# Patient Record
Sex: MALE
[De-identification: names, ages, dates, MRNs, and addresses within clinical notes are randomized per-mention and may not be internally consistent; named-entity substitution may affect disease eponyms.]

---

## 2017-06-27 ENCOUNTER — HOSPITAL ENCOUNTER (INPATIENT)
Dept: HOSPITAL 14 - H.ER | Age: 42
Discharge: HOME | DRG: 669 | End: 2017-06-27
Attending: INTERNAL MEDICINE | Admitting: INTERNAL MEDICINE
Payer: COMMERCIAL

## 2017-06-27 DIAGNOSIS — N50.812: ICD-10-CM

## 2017-06-27 DIAGNOSIS — N13.2: Primary | ICD-10-CM

## 2017-06-27 DIAGNOSIS — Z87.442: ICD-10-CM

## 2017-06-27 DIAGNOSIS — E87.6: ICD-10-CM

## 2017-06-27 LAB
ALBUMIN SERPL-MCNC: 4.5 G/DL (ref 3.5–5)
ALBUMIN/GLOB SERPL: 1.4 {RATIO} (ref 1–2.1)
ALT SERPL-CCNC: 53 U/L (ref 21–72)
AST SERPL-CCNC: 27 U/L (ref 17–59)
BASOPHILS # BLD AUTO: 0.1 K/UL (ref 0–0.2)
BASOPHILS NFR BLD: 0.9 % (ref 0–2)
BUN SERPL-MCNC: 17 MG/DL (ref 9–20)
CALCIUM SERPL-MCNC: 10 MG/DL (ref 8.4–10.2)
EOSINOPHIL # BLD AUTO: 0.3 K/UL (ref 0–0.7)
EOSINOPHIL NFR BLD: 3.5 % (ref 0–4)
ERYTHROCYTE [DISTWIDTH] IN BLOOD BY AUTOMATED COUNT: 13.3 % (ref 11.5–14.5)
GFR NON-AFRICAN AMERICAN: > 60
HGB BLD-MCNC: 15.3 G/DL (ref 12–18)
INR PPP: 1 (ref 0.9–1.2)
LYMPHOCYTES # BLD AUTO: 3.3 K/UL (ref 1–4.3)
LYMPHOCYTES NFR BLD AUTO: 34.3 % (ref 20–40)
MCH RBC QN AUTO: 28.9 PG (ref 27–31)
MCHC RBC AUTO-ENTMCNC: 34 G/DL (ref 33–37)
MCV RBC AUTO: 85.1 FL (ref 80–94)
MONOCYTES # BLD: 1 K/UL (ref 0–0.8)
MONOCYTES NFR BLD: 10.5 % (ref 0–10)
NEUTROPHILS # BLD: 5 K/UL (ref 1.8–7)
NEUTROPHILS NFR BLD AUTO: 50.8 % (ref 50–75)
NRBC BLD AUTO-RTO: 0.1 % (ref 0–0)
PLATELET # BLD: 261 K/UL (ref 130–400)
PMV BLD AUTO: 7.7 FL (ref 7.2–11.7)
PROTHROMBIN TIME: 11.5 SECONDS (ref 9.8–13.1)
RBC # BLD AUTO: 5.31 MIL/UL (ref 4.4–5.9)
WBC # BLD AUTO: 9.8 K/UL (ref 4.8–10.8)

## 2017-06-27 PROCEDURE — 0TC78ZZ EXTIRPATION OF MATTER FROM LEFT URETER, VIA NATURAL OR ARTIFICIAL OPENING ENDOSCOPIC: ICD-10-PCS | Performed by: UROLOGY

## 2017-06-27 PROCEDURE — 0T778DZ DILATION OF LEFT URETER WITH INTRALUMINAL DEVICE, VIA NATURAL OR ARTIFICIAL OPENING ENDOSCOPIC: ICD-10-PCS | Performed by: UROLOGY

## 2017-06-27 NOTE — ED PDOC
- Laboratory Results


Result Diagrams: 


 06/27/17 05:32





 06/27/17 05:32





- ECG


O2 Sat by Pulse Oximetry: 99 (RA)


Pulse Ox Interpretation: Normal





Medical Decision Making


Medical Decision Making: 


Time: 0600





Initial impression: Renal colic





Initial plan:





--Patient signed out to me by Darya Ashby PA-C. Pending Urology consult 

and evaluation





0620: Consulted with Dr. Montesinos, PT to be admitted to medicine  Dr. Tyler on 

call and he will see pt for possible surgery


pt made aware





Scribe Attestation:


Documented by Alicia Kevin, acting as a scribe for Patsy Palacios MD.





MD Scribe Attestation:


All medical record entries made by the Scribe were at my direction and 

personally dictated by me. I have reviewed the chart and agree that the record 

accurately reflects my personal performance of the history, physical exam, 

medical decision making, and the department course for this patient. I have 

also personally directed, reviewed, and agree with the discharge instructions 

and disposition.











Disposition


Discussed With Dr.: Travis Montesinos


Comment: Will consult with 


Doctor Will See Patient In The: Hospital





- Clinical Impression


Clinical Impression: 


 Renal colic








- POA


Present On Arrival: None





- Disposition


Disposition: Admitted as In-Patient


Disposition Time: 06:20


Condition: STABLE

## 2017-06-27 NOTE — CT
PROCEDURE:  CT abdomen pelvis dated 06/27/2017 



HISTORY:

Rule out renal stone 



COMPARISON:

None.



TECHNIQUE:

Contiguous axial images of the abdomen and pelvis. Oral contrast was 

administered. No IV contrast given. Coronal and Sagittal reformats 

generated. 



Radiation dose:



Total exam DLP = 748.68 mGy-cm.



This CT exam was performed using one or more of the following dose 

reduction techniques: Automated exposure control, adjustment of the 

mA and/or kV according to patient size, and/or use of iterative 

reconstruction technique.



FINDINGS:



LOWER THORAX:

Lung bases are free of focal consolidation effusion or basilar 

pneumothorax.  There is a small hiatal hernia. Heart size is within 

range of normal.  No pericardial effusion.



LIVER:

Liver exhibits normal size measuring approximately 15.4 cm in CC 

dimension. .  No obvious hepatic mass collection or calcification. 



GALLBLADDER AND BILE DUCTS:

The gallbladder is physiologically distended.  No evidence of 

intraluminal gallbladder calculi. . 



PANCREAS:

Unremarkable. No mass. No ductal dilatation.



SPLEEN:

Unremarkable. No splenomegaly. 



ADRENALS:

No adrenal lesions. 



KIDNEYS AND URETERS:

Kidneys exhibit relatively symmetric size. . 



There is an obstructing proximal left ureteral calculus that measures 

approximately 6 mm with mild -moderate left-sided hydronephrosis.  

Infiltration/induration surrounding the on ureter most pronounced the 

level of the calculus with some infiltration changes in the left 

perinephric fat. Nonobstructing 8.5 mm calculus seen in the 

upper/midpole left kidney 



There are 3 right-sided renal calculi 1 in the mid and another in the 

lower pole and another in the upper/midpole measuring approximately 

5.4 mm and 6 mm and 3.7 mm respectively. 



BLADDER:

Urinary bladder is incompletely distended which may account for 

slight thick-walled appearance.  Muscular hypertrophy may contribute. 

 Possibility of a cystitis not excluded. 



REPRODUCTIVE:

Unremarkable. 



APPENDIX:

Appendix is unremarkable without evidence of surrounding inflammatory 

changes.



BOWEL:

Evaluation of the bowel is limited due to the lack of oral contrast 

material. Stomach is incompletely distended which may account for 

slight thick-walled appearance.  Gastritis not excluded. Visualized 

loops of small bowel exhibit normal contour and caliber. No evidence 

of acute mechanical small bowel obstruction. 



Most of the colon is collapsed which may account for slight 

thick-walled appearance however concomitant submucosal fat deposition 

possibly due to chronic inflammation or mild of wall edema to mild 

colitis be considered.  Clinical correlation recommended. 



PERITONEUM:

Unremarkable. No fluid collection. No free air. Small fat containing 

bilateral inguinal hernias present. .  Small fat containing umbilical 

hernia. 



LYMPH NODES:

Unremarkable. No enlarged lymph nodes. 



VASCULATURE:

Unremarkable. No aortic aneurysm. 



BONES:

No fracture or destructive lesion. 



OTHER FINDINGS:

None. 



IMPRESSION:

Nephrolithiasis with obstructing 6 mm calculus proximal left ureter 

with mild to moderate left-sided hydronephrosis.



There is mild submucosal fat deposition and/or wall edema involving 

good portion of the colon possibly secondary to chronic inflammation 

however possibility of a mild colitis to be considered.



See above discussion for additional findings and details.

## 2017-06-27 NOTE — ED PDOC
HPI: Male  Pain


Time Seen by Provider: 06/27/17 04:11


Chief Complaint (Nursing): Male Genitourinary


Chief Complaint (Provider): Flank pain


History Per: Patient


Additional Complaint(s): 





Pt is a 43 yo male, PMH of kidney stones requiring lithotripsy, presents to ED 

with complaints of left flank pain radiating to left testicle x1 hour. Pt 

denies any problems urinating. As per pt, he woke up with the pain. Pt took 

Advil at 0330.





Past Medical History


Reviewed: Nursing Documentation, Vital Signs


Vital Signs: 





 Last Vital Signs











Temp  98 F   06/27/17 04:14


 


Pulse  52 L  06/27/17 04:14


 


Resp  18   06/27/17 04:14


 


BP  150/99 H  06/27/17 04:14


 


Pulse Ox  99   06/27/17 04:14














- Medical History


Other PMH: Kidney stones





- Surgical History


Other surgeries: lithotripsy





- Family History


Family History: States: Unknown Family Hx





- Living Arrangements


Living Arrangements: With Family





- Social History


Current smoker - smoking cessation education provided: No


Ex-Smoker (has not smoked in the last 12 months): No


Alcohol: Social


Drugs: Denies





- Allergies


Allergies/Adverse Reactions: 


 Allergies











Allergy/AdvReac Type Severity Reaction Status Date / Time


 


No Known Allergies Allergy   Verified 06/27/17 04:16














Review of Systems


ROS Statement: Except As Marked, All Systems Reviewed And Found Negative


Musculoskeletal: Positive for: Back Pain





Physical Exam





- Reviewed


Nursing Documentation Reviewed: Yes


Vital Signs Reviewed: Yes





- Physical Exam


Appears: Positive for: Non-toxic, No Acute Distress, Uncomfortable


Head Exam: Positive for: ATRAUMATIC, NORMAL INSPECTION, NORMOCEPHALIC


Skin: Positive for: Normal Color, Warm, DRY


Eye Exam: Positive for: EOMI, Normal appearance, PERRL


ENT: Positive for: Normal ENT Inspection


Neck: Positive for: Normal, Painless ROM


Cardiovascular/Chest: Positive for: Regular Rate, Rhythm


Respiratory: Positive for: CNT, Normal Breath Sounds


Gastrointestinal/Abdominal: Positive for: Normal Exam, Bowel Sounds, Soft


Back: Positive for: L CVA Tenderness


Extremity: Positive for: Normal ROM


Neurologic/Psych: Positive for: Alert, Oriented





- ECG


O2 Sat by Pulse Oximetry: 99





Medical Decision Making


Medical Decision Making: 





IV access established and treatment initiated with IVF, Zofran, Toradol and 

Morphine





Diagnostics ordered





case endorsed to ED MD, Dr. Lr, pending diagnostic review and re-eval





Disposition





- Clinical Impression


Clinical Impression: 


 Renal colic








- Patient ED Disposition


Is Patient to be Admitted: No





- Disposition


Disposition: Transfer of Care (dr. lr)


Disposition Time: 05:35


Condition: STABLE





- POA


Present On Arrival: None

## 2017-06-27 NOTE — CP.PCM.HP
History of Present Illness





- History of Present Illness


History of Present Illness: 





A 42 yr old male with hx of nephrolithiass 5 yrs ago was admitted for renal 

colic ,now came with c\o  testicular pain on left side  with abdominal pain 

lower , sharp ,colicky 10\10 from last night ,did not get better with OTC meds, 

no relief .


notes he wasn't drinking lot of fluids, he does not know what kind of stones he 

had in past


he is worried about diet,pros and cons of repititive events


also asking if sx can be done in am as his mom is going to be here this pm





Present on Admission





- Present on Admission


Any Indicators Present on Admission: No





Review of Systems





- Constitutional


Constitutional: absent: Anorexia, Chills, Fever, Frequent Falls, Weight Loss





- EENT


Eyes: Photophobia.  absent: Other Visual Disturbances


Nose/Mouth/Throat: Sore Throat.  absent: Nasal Congestion, Post Nasal Drip





- Cardiovascular


Cardiovascular: absent: Chest Pain, Dyspnea, Leg Edema, Rapid Heart Rate





- Respiratory


Respiratory: absent: Cough, Wheezing, Chest Congestion, Excessive Mucous 

Production





- Gastrointestinal


Gastrointestinal: absent: Diarrhea, Dyspepsia, Heartburn, Odynophagia, Vomiting





- Genitourinary


Genitourinary: absent: Difficulty Urinating, Hematuria, Nocturia, Urinary 

Urgency, Freq UTI





- Musculoskeletal


Musculoskeletal: absent: Arthralgias, Limited Range of Motion, Myalgias





- Integumentary


Integumentary: absent: Acne, Lesions, Sores





- Neurological


Neurological: absent: Abnormal Gait, Convulsions, Headaches, Syncope, Weakness





- Psychiatric


Psychiatric: absent: Anxiety, Difficulty Concentrating





- Endocrine


Endocrine: absent: Fatigue





- Hematologic/Lymphatic


Hematologic: absent: Lymphadenopathy





Past Patient History





- Past Medical History & Family History


Past Medical History?: Yes





- Past Social History


Smoking Status: Never Smoked





- CARDIAC


Hx Cardiac Disorders: No


Hx Angina: No


Hx Atrial Fibrillation: No


Hx Cardia Arrhythmia: No


Hx Circulatory Problems: No


Hx Congestive Heart Failure: No


Hx Heart Attack: No


Hx Heart Murmur: No


Hx Heart Transplant: No


Hx Hypercholesterolemia: No


Hx Hypertension: No


Hx Hypotension: No


Hx Internal Defibrillator: No


Hx Mitral Valve Prolapse: No


Hx Pacemaker: No


Hx Peripheral Edema: No


Hx Peripheral Vascular Disease: No





- PULMONARY


Hx Respiratory Disorders: Yes


Hx Asthma: Yes


Hx Bronchitis: No


Hx Chronic Obstructive Pulmonary Disease (COPD): No


Hx Emphysema: No


Hx Lung Cancer: No


Hx Pneumonia: No


Hx Pulmonary Edema: No


Hx Pulmonary Embolism: No


Hx Respiratory Aspiration: No


Hx Respiratory Tract Infection: No


Hx Sleep Apnea: No


Hx Tuberculosis: No





- NEUROLOGICAL


Hx Neurological Disorder: No


Hx Alzheimer's Disease: No


HX Cerebrovascular Accident: No


Hx Dementia: No


Hx Dizziness: No


Hx Meningitis: No


Hx Migraine: No


Hx Multiple Sclerosis: No


Hx Paralysis: No


Hx Parkinson's Disease: No


Hx Seizures: No


Hx Syncope: No


Hx Transient Ischemic Attacks (TIA): No


Hx Vertigo: No





- HEENT


Hx HEENT Problems: No


Hx Blind: No


Hx Cataracts: No


Hx Deafness: No


Hx Difficulty Chewing: No


Hx Epistaxis: No


Hx Glaucoma: No


Hx Macular Degeneration: No


Hx Sinusitis: No





- RENAL


Hx Chronic Kidney Disease: Yes


Hx Dialysis: No


Hx Kidney Stones: Yes


Hx Neurogenic Bladder: No


Hx Pyelonephritis: No


Hx Renal (Kidney) Cancer: No


Hx Renal Failure: No





- ENDOCRINE/METABOLIC


Hx Endocrine Disorders: No


Hx Adrenal Cancer: No


Hx Diabetes Insipidus: No


Hx Diabetes Mellitus Type 1: No


Hx Diabetes Mellitus Type 2: No


Hx Hyperthyroidism: No


Hx Hypothyroidism: No


Hx Systemic Lupus Erythematosus: No





- HEMATOLOGICAL/ONCOLOGICAL


Hx Blood Disorders: No


Hx AIDS: No


Hx Anemia: No


Hx Blood Transfusions: No


Hx Blood Transfusion Reaction: No


Hx Bruising: No


Hx Cancer: No


Hx Chemotherapy: No


Hx Cirrhosis: No


Hx Gum Bleeding: No


Hx Hemophilia: No


Hx Hepatitis A: No


Hx Hepatitis B: No


Hx Hepatitis C: No


Hx Human Immunodeficiency Virus (HIV): No


Hx Leukemia: No


Hx Metastesis: No


Hx Shingles: No


Hx Sickle Cell Disease: No


Hx Unexplained Bleeding: No


Hx von Willebrand's Disease: No





- INTEGUMENTARY


Hx Dermatological Problems: No


Hx Basil Cell: No


Hx Burns: No


Hx Cellulitis: No


Hx Eczema: No


Hx Melanoma: No


Hx Psoriasis: No


Hx Squamous Cell: No





- MUSCULOSKELETAL/RHEUMATOLOGICAL


Hx Musculoskeletal Disorders: No


Hx Arthritis: No


Hx Back Pain: No


Hx Degenerative Joint Disease: No


Hx Falls: No


Hx Fractures: No


Hx Gout: No


Hx Herniated Disk: No


Hx Myasthenia Gravis: No


Hx Osteoarthritis: No


Hx Osteomyelitis: No


Hx Osteoporosis: No


Hx Rhabdomyolysis: No


Hx Rheumatoid Arthritis: No


Hx Spinal Stenosis: No


Hx Unsteady Gait: No





- GASTROINTESTINAL


Hx Gastrointestinal Disorders: No


Hx Bowel Surgery: No


Hx Clostridium Difficile: No


Hx Colitis: No


Hx Colostomy: No


Hx Constipation: No


Hx Crohn's Disease: No


Hx Diarrhea: No


Hx Diverticulitis: No


Hx Esophageal Varices: No


Hx Fatty Liver Disease: No


Hx Gall Bladder Disease: No


Hx Gastritis: No


Hx Gastroesophageal Reflux: No


Hx Hemorrhoids: No


Hx Ileostomy: No


Hx Irritable Bowel: No


Hx Liver Failure: No


Hx Nausea: No


Hx Pancreatitis: No


HX Swallowing Problems: No


Hx Ulcer: No


Hx Vomiting: No





- GENITOURINARY/GYNECOLOGICAL


Hx Genitourinary Disorders: No


Hx Bladder Cancer: No


Hx Bladder Stone: No


Hx Hematuria: No


Hx Incontinence: No


Hx Prostate Cancer: No


Hx Prostate Problems: No


Hx Reproductive Disorders: No


Hx Sexually Transmitted Disorders: No


Hx Urinary Tract Infection: No





- PSYCHIATRIC


Hx Psychophysiologic Disorder: No


Hx Anxiety: No


Hx Bipolar Disorder: No


Hx Depression: No


Hx Emotional Abuse: No


Hx Hallucinations: No


Hx Panic Symptoms: No


Hx Paranoia: No


Hx Post Traumatic Stress Disorder: No


Hx Psychosis: No


Hx Physical Abuse: No


Hx Schizophrenia: No


Hx Sexual Abuse: No


Hx Substance Use: No





- SURGICAL HISTORY


Hx Surgeries: No


Hx Abdominal Aortic Aneurysm Repair: No


Hx Amputation: No


Hx Angiogram: No


Hx Angioplasty: No


Hx Appendectomy: No


Hx Arteriovenous Shunt: No


Hx Arthroscopy: No


Hx Bile Duct Stent: No


Hx Breast Biopsy: No


Hx Cataract Extraction: No


Hx Cardiac Catheterization: No


Hx Carotid Endarterectomy: No


Hx  Section: No


Hx Cholecystectomy: No


Hx Coronary Artery Bypass Graft: No


Hx Coronary Stent: No


Hx Dilation and Curettage: No


Hx Eye Surgery: No


Hx Femoral-Popliteal Bypass Graft: No


Hx Gastric Bypass Surgery: No


Hx Herniorrhaphy: No


Hx Hysterectomy: No


Hx Joint Replacement: No


Hx Kidney Transplant: No


Hx Liver Transplant: No


Hx Mastectomy: No


Hx Musculoskeletal Surgery: No


Hx Open Heart Surgery: No


Hx Open Reduction Internal Fixation: No


Hx Orthopedic Surgery: No


Hx Parathyroidectomy: No


Hx Penile Implant: No


Hx Pulmonary Surgery: No


Hx Splenectomy: No


Hx Thyroidectomy: No


Hx Tonsillectomy: No


Hx Tubal Ligation: No


Hx Valve Replacement: No


Hx Vascular Surgery: No


Hx Vascular Access Device: No





- ANESTHESIA


Hx Anesthesia: No


Hx Anesthesia Reactions: No


Hx Malignant Hyperthermia: No


Has any member of the family had a problem w/ anesthesia?: No





Meds


Allergies/Adverse Reactions: 


 Allergies











Allergy/AdvReac Type Severity Reaction Status Date / Time


 


No Known Allergies Allergy   Verified 17 04:16














Physical Exam





- Constitutional


Appears: No Acute Distress





- Head Exam


Head Exam: NORMAL INSPECTION





- Eye Exam


Eye Exam: EOMI, PERRL.  absent: Scleral icterus


Pupil Exam: PERRL





- ENT Exam


ENT Exam: Mucous Membranes Moist, Normal Exam





- Neck Exam


Neck exam: Positive for: Normal Inspection.  Negative for: Lymphadenopathy, 

Tenderness





- Respiratory Exam


Respiratory Exam: Clear to Auscultation Bilateral, NORMAL BREATHING PATTERN.  

absent: Rales, Rhonchi, Wheezes





- Cardiovascular Exam


Cardiovascular Exam: REGULAR RHYTHM, +S1, +S2.  absent: Systolic Murmur





- GI/Abdominal Exam


GI & Abdominal Exam: Normal Bowel Sounds, Soft.  absent: Tenderness





- Extremities Exam


Extremities exam: Positive for: normal inspection, pedal pulses present.  

Negative for: pedal edema





- Back Exam


Back exam: absent: CVA tenderness (L), CVA tenderness (R), tenderness





- Neurological Exam


Neurological exam: Alert, Oriented x3





- Psychiatric Exam


Psychiatric exam: Normal Affect, Normal Mood





- Skin


Skin Exam: Intact, Warm





Results





- Vital Signs


Recent Vital Signs: 





 Last Vital Signs











Temp  98.5 F   17 19:30


 


Pulse  60   17 19:30


 


Resp  20   17 19:30


 


BP  130/77   17 19:30


 


Pulse Ox  97   17 19:30














- Labs


Result Diagrams: 


 17 05:32





 17 05:32


Labs: 





 Laboratory Results - last 24 hr











  17





  17:00


 


PT  11.5


 


INR  1.0














- Imaging and Cardiology


  ** CT scan - abdomen


Status: Report reviewed by me





Assessment & Plan


(1) Nephrolithiasis


Status: Acute   


Comment: NPO.  IVF.  FOR LITHOTRIPSY.  cleared pateint to sx.  discuss and 

answered all qusetions in detail   





(2) Renal colic


Status: Acute   


Comment: iv morphine.  pain free for now.  ivf   





(3) Hypokalemia


Status: Acute   


Comment: replace k   





Decision To Admit





- Pt Status Changed To:


Hospital Disposition Of: Inpatient





- Admit Certification


Admit to Inpatient:: After my assessment, the patient will require 

hospitalization for at least two midnights.  This is because of the severity of 

symptoms shown, intensity of services needed, and/or the medical risk in this 

patient being treated as an outpatient.





- .


Bed Request Type: Med/Surg


Admitting Physician: Hannah Tyler

## 2017-06-28 VITALS — OXYGEN SATURATION: 99 %

## 2017-06-28 VITALS
TEMPERATURE: 97.7 F | HEART RATE: 58 BPM | DIASTOLIC BLOOD PRESSURE: 75 MMHG | RESPIRATION RATE: 19 BRPM | SYSTOLIC BLOOD PRESSURE: 123 MMHG

## 2017-06-28 NOTE — RAD
PROCEDURE:  Intraoperative Fluoroscopy. 



HISTORY:

Fluoroscopy



FINDINGS:

Fluoroscopic assistance was provided for stent placement. Please 



fluoroscopic time (continuous mode) utilized during the procedure: 

0.4 seconds.

## 2017-07-16 NOTE — CP.PCM.DIS
Provider





- Provider


Date of Admission: 


06/27/17 06:18





Attending physician: 


Hannah Tyler MD





Time Spent in preparation of Discharge (in minutes): 10





Diagnosis





- Discharge Diagnosis


(1) Nephrolithiasis


Status: Acute   





(2) Renal colic


Status: Acute   





(3) Hypokalemia


Status: Acute   





Hospital Course





- Lab Results


Lab Results: 


 Most Recent Lab Values











WBC  9.8 K/uL (4.8-10.8)   06/27/17  05:32    


 


RBC  5.31 Mil/uL (4.40-5.90)   06/27/17  05:32    


 


Hgb  15.3 g/dL (12.0-18.0)   06/27/17  05:32    


 


Hct  45.1 % (35.0-51.0)   06/27/17  05:32    


 


MCV  85.1 fl (80.0-94.0)   06/27/17  05:32    


 


MCH  28.9 pg (27.0-31.0)   06/27/17  05:32    


 


MCHC  34.0 g/dL (33.0-37.0)   06/27/17  05:32    


 


RDW  13.3 % (11.5-14.5)   06/27/17  05:32    


 


Plt Count  261 K/uL (130-400)   06/27/17  05:32    


 


MPV  7.7 fl (7.2-11.7)   06/27/17  05:32    


 


Neut % (Auto)  50.8 % (50.0-75.0)   06/27/17  05:32    


 


Lymph % (Auto)  34.3 % (20.0-40.0)   06/27/17  05:32    


 


Mono % (Auto)  10.5 % (0.0-10.0)  H  06/27/17  05:32    


 


Eos % (Auto)  3.5 % (0.0-4.0)   06/27/17  05:32    


 


Baso % (Auto)  0.9 % (0.0-2.0)   06/27/17  05:32    


 


Neut #  5.0 K/uL (1.8-7.0)   06/27/17  05:32    


 


Lymph #  3.3 K/uL (1.0-4.3)   06/27/17  05:32    


 


Mono #  1.0 K/uL (0.0-0.8)  H  06/27/17  05:32    


 


Eos #  0.3 K/uL (0.0-0.7)   06/27/17  05:32    


 


Baso #  0.1 K/uL (0.0-0.2)   06/27/17  05:32    


 


PT  11.5 Seconds (9.8-13.1)   06/27/17  17:00    


 


INR  1.0  (0.9-1.2)   06/27/17  17:00    


 


Sodium  141 mmol/l (132-148)   06/27/17  05:32    


 


Potassium  3.4 MMOL/L (3.6-5.0)  L  06/27/17  05:32    


 


Chloride  106 mmol/L ()   06/27/17  05:32    


 


Carbon Dioxide  22 mmol/L (22-30)   06/27/17  05:32    


 


Anion Gap  16  (10-20)   06/27/17  05:32    


 


BUN  17 mg/dl (9-20)   06/27/17  05:32    


 


Creatinine  1.3 mg/dL (0.8-1.5)   06/27/17  05:32    


 


Est GFR ( Amer)  > 60   06/27/17  05:32    


 


Est GFR (Non-Af Amer)  > 60   06/27/17  05:32    


 


Random Glucose  106 mg/dL ()   06/27/17  05:32    


 


Calcium  10.0 mg/dL (8.4-10.2)   06/27/17  05:32    


 


Total Bilirubin  0.6 mg/dl (0.2-1.3)   06/27/17  05:32    


 


AST  27 U/L (17-59)   06/27/17  05:32    


 


ALT  53 U/L (21-72)   06/27/17  05:32    


 


Alkaline Phosphatase  77 U/L ()   06/27/17  05:32    


 


Total Protein  7.8 G/DL (6.3-8.2)   06/27/17  05:32    


 


Albumin  4.5 g/dL (3.5-5.0)   06/27/17  05:32    


 


Globulin  3.3 gm/dL (2.2-3.9)   06/27/17  05:32    


 


Albumin/Globulin Ratio  1.4  (1.0-2.1)   06/27/17  05:32    














- Hospital Course


Hospital Course: 





s\p ESWL


urology cleared pt home


and he is willing to go to ASAP





Discharge Exam





- Head Exam


Head Exam: NORMAL INSPECTION





Discharge Plan





- Follow Up Plan


Condition: STABLE


Disposition: HOME/ ROUTINE


Instructions:  Cystoscopy (DC), Ureteral Stent Placement (DC)


Additional Instructions: 


to be at [facility]  thursday at 9 am


and follow up with dr tyler (call for appt) [phone]  [facility] 


Referrals: 


Travis Montesinos MD [Medical Doctor] -

## 2017-08-04 ENCOUNTER — HOSPITAL ENCOUNTER (OUTPATIENT)
Dept: HOSPITAL 14 - H.OPSURG | Age: 42
Discharge: HOME | End: 2017-08-04
Attending: UROLOGY
Payer: COMMERCIAL

## 2017-08-04 VITALS
DIASTOLIC BLOOD PRESSURE: 75 MMHG | OXYGEN SATURATION: 98 % | SYSTOLIC BLOOD PRESSURE: 138 MMHG | HEART RATE: 59 BPM | TEMPERATURE: 98.2 F

## 2017-08-04 VITALS — BODY MASS INDEX: 26.4 KG/M2

## 2017-08-04 VITALS — RESPIRATION RATE: 18 BRPM

## 2017-08-04 DIAGNOSIS — N23: Primary | ICD-10-CM

## 2017-08-04 NOTE — OP
PROCEDURE DATE:  08/04/2017



PREOPERATIVE DIAGNOSIS:  Post left lithotripsy for renal calculus.



POSTOPERATIVE DIAGNOSIS:  Post left lithotripsy for renal calculus.



PROCEDURE:  Cystoscopy with removal of the double-J stent under IV

sedation.



DESCRIPTION OF PROCEDURE:  The patient was placed in the operating room

table in dorsal lithotomy position.  The groin was draped and prepped in

sterile manner.  Using a #21 cystoscope, I entered into the bladder

atraumatically and identified the existing J-stent.  Using the grasping

forceps, I engaged it, removed it, and both ends were intact.  The stent

was sent for analysis and the patient was taken from the operating room in

good condition.





__________________________________________

Travis Montesinos MD





DD:  08/04/2017 13:08:12

DT:  08/04/2017 15:00:08

Job # [id number]

## 2017-08-15 NOTE — OP
PROCEDURE DATE:  06/27/2017



PREOPERATIVE DIAGNOSIS:  Left renal colic secondary to multiple left renal

calculi and ureteral calculus.



PROCEDURE PERFORMED:  Cystoscopy, left ureteroscopy, laser lithotripsy of a

left ureteral calculus, and insertion of a double-J stent.



DESCRIPTION OF PROCEDURE:  The patient was placed in the operating room

table in a dorsal lithotomy position.  The area of the groin was draped and

prepped in sterile manner.  Using a short urethroscope and floppy tip

guidewire, I advanced the urethroscope to the level of the obstructing

stone in the mid ureter on the left side.  By x-ray, it was noted to be 6

mm in size.  I then was able to under direct vision to perform laser

lithotripsy of that stone following the successful breaking and removal of

those stones with a basket.  I then proceeded to look to the level of renal

pelvis.  There was a larger stone at that level.  At this time, then a

sensor wire was in place and in placed a 6-[place] multi-length double-J

stent over that sensor wire.  The second stone within the renal pelvis will

be handled by extracorporeal shock wave lithotripsy and that appointment

will be made at a future day.  For this procedure then, the instrumentation

was removed and the patient then was taken from the operation room in good

condition.







__________________________________________

Travis Montesinos MD





DD:  08/14/2017 11:26:09

DT:  08/14/2017 16:16:32

Job # [id number]

## 2018-04-25 ENCOUNTER — HOSPITAL ENCOUNTER (EMERGENCY)
Dept: HOSPITAL 14 - H.ER | Age: 43
Discharge: HOME | End: 2018-04-25
Payer: COMMERCIAL

## 2018-04-25 VITALS
TEMPERATURE: 98.9 F | RESPIRATION RATE: 16 BRPM | OXYGEN SATURATION: 100 % | SYSTOLIC BLOOD PRESSURE: 133 MMHG | DIASTOLIC BLOOD PRESSURE: 82 MMHG | HEART RATE: 67 BPM

## 2018-04-25 VITALS — BODY MASS INDEX: 26.4 KG/M2

## 2018-04-25 DIAGNOSIS — M79.672: Primary | ICD-10-CM

## 2018-04-25 NOTE — RAD
PROCEDURE:  Left Foot Radiographs.



HISTORY:

Foot pain



COMPARISON:

Correlation made with concurrent radiographs of the left ankle.



FINDINGS:



BONES:

No evidence of acute displaced fracture nor dislocation.  The osseous 

structures appear intact.  No obvious cortical destructive changes. 



JOINTS:

Normal. 



SOFT TISSUES:

There is a tiny rounded/elliptical shaped corticated density within 

the medial soft tissues adjacent to the distal aspect proximal 

phalanx 1st toe that may represent some old posttraumatic 

mineralization. No evidence 



OTHER FINDINGS:

None.



IMPRESSION:

No evidence of acute displaced fracture nor dislocation.

## 2018-04-25 NOTE — ED PDOC
Lower Extremity Pain/Injury


Time Seen by Provider: 04/25/18 09:00


Chief Complaint (Nursing): Lower Extremity Problem/Injury


Chief Complaint (Provider): Left foot pain


History Per: Patient


History/Exam Limitations: no limitations


Onset/Duration Of Symptoms: Days (x1), Worse Since (onset)


Current Symptoms Are (Timing): Still Present


Additional Complaint(s): 





Aníbal Osman is a 43 year old male, with no significant past medical history, 

who presents to the emergency department complaining of progressively worst 

left foot pain onset since yesterday. Patient states pain is localized to the 

dorsum of foot, great toe and on the left side under the great toe. He denies 

any trauma, fever, chills, or hx of gout. No further medical complaints.





PMD: None provided. 





Past Medical History


Reviewed: Historical Data, Nursing Documentation, Vital Signs


Vital Signs: 





 Last Vital Signs











Temp  98.9 F   04/25/18 08:52


 


Pulse  67   04/25/18 08:52


 


Resp  16   04/25/18 08:52


 


BP  133/82   04/25/18 08:52


 


Pulse Ox  100   04/25/18 08:52














- Medical History


PMH: Asthma, Kidney Stones, Chronic Kidney Disease


   Denies: Alzheimer's Disease, Anemia, Anxiety, Arthritis, Atrial Fibrillation

, Bipolar Disorder, Bronchitis, CAD, Cardia Arrhythmia, CHF, COPD, Crohn's 

Disease, Dementia, Depression, Diverticulitis, Emphysema, Fractures, Gastritis, 

Gall Bladder Disease, HIV, HTN, Hypercholesterolemia, Hyperthyroidism, 

Hypothyroidism, Migraine, Mitral Valve Prolapse, Multiple Sclerosis, 

Osteoporosis, Pancreatitis, Paranoia, Parkinson's Disease, Peripheral Edema, 

Pneumonia, Post Traumatic Stress Disorder, Pulmonary Embolism, Rheumatoid 

Arthritis, Schizophrenia, Seizures, Sickle Cell Disease, Sexually Transmitted 

Disease, Sleep Apnea, TIA





- Surgical History


Surgical History: 


   Denies: Appendectomy, CABG, Carotid Endarterectomy, Cholecystectomy, 

Coronary Stent, Pacemaker, Tonsillectomy





- Family History


Family History: States: Unknown Family Hx





- Social History


Current smoker - smoking cessation education provided: No


Alcohol: None


Drugs: Denies





- Home Medications


Home Medications: 


 Ambulatory Orders











 Medication  Instructions  Recorded


 


Cephalexin [cephalexin] 500 mg PO QID #28 cap 04/25/18














- Allergies


Allergies/Adverse Reactions: 


 Allergies











Allergy/AdvReac Type Severity Reaction Status Date / Time


 


No Known Allergies Allergy   Verified 06/27/17 04:16














Review of Systems


ROS Statement: Except As Marked, All Systems Reviewed And Found Negative


Constitutional: Negative for: Fever, Chills


Musculoskeletal: Positive for: Foot Pain (left)





Physical Exam





- Reviewed


Nursing Documentation Reviewed: Yes


Vital Signs Reviewed: Yes





- Physical Exam


Appears: Positive for: Non-toxic, No Acute Distress


Head Exam: Positive for: ATRAUMATIC, NORMOCEPHALIC


Skin: Positive for: Normal Color, Warm, Dry


Eye Exam: Positive for: Normal appearance


Neck: Positive for: Painless ROM, Supple


Respiratory: Negative for: Respiratory Distress


Extremity: Positive for: Normal ROM (Left foot full ROM), Tenderness (Left 

great toe swollen and tender over the dorsum of the foot. Neurovascularly 

intact.), Swelling (Left foot slightly swollen, no erythema. 2+ DP).  Negative 

for: Deformity


Neurologic/Psych: Positive for: Alert, Oriented.  Negative for: Motor/Sensory 

Deficits





- ECG


O2 Sat by Pulse Oximetry: 100 (RA)


Pulse Ox Interpretation: Normal





Medical Decision Making


Medical Decision Making: 





Initial Impression: Foot pain r/o fracture and gout





Initial Plan:





--Uric acid 


--Motrin tab 600 mg PO


--Ankle left 3 views [RAD]


--Foot left 3 views [RAD]


--Reevaluation





-No family hx of gout but will order uric acid in case of first presentation.





-Explained to patient xray will only take a look at bones and if negative and 

he still has pain he will need an outpatient MRI to further evaluate cartilage 

if they're negative. 





11:32


-Uric acid normal. Xrays were read by me and show no acute findings. Will refer 

patient to podiatry clinic. ace bandage offered


on reval prior to dc noted slight erythema developed over dorsum of foot so 

will treat with keflex.





11:35


Upon provider evaluation patient is medically stable, and requires no further 

treatment in the ED at this time. Patient will be discharged home with Rx for 

cephalexin. Counseling was provided and all questions were answered regarding 

diagnosis and need for follow up with podiatry clinic. There is agreement to 

discharge plan. Return if symptoms persist or worsen.








--------------------------------------------------------------------------------

-----------------


Scribe Attestation:


Documented by Bill Jansen, acting as a scribe for Patsy Palacios MD





Provider Scribe Attestation:


All medical record entries made by the Scribe were at my direction and 

personally dictated by me. I have reviewed the chart and agree that the record 

accurately reflects my personal performance of the history, physical exam, 

medical decision making, and the department course for this patient. I have 

also personally directed, reviewed, and agree with the discharge instructions 

and disposition.





Disposition





- Clinical Impression


Clinical Impression: 


 Foot pain








- Patient ED Disposition


Is Patient to be Admitted: No


Counseled Patient/Family Regarding: Studies Performed, Diagnosis, Need For 

Followup





- Disposition


Referrals: 


Podiatry Clinic [Outside]


Gabe Workman MD [Staff Provider] - 


Disposition: Routine/Home


Disposition Time: 11:05


Condition: IMPROVED


Additional Instructions: 


follow up with podiatry within 2 days 


take motrin for pain


return to the ED with any worsening or concerning symptoms


Prescriptions: 


Cephalexin [cephalexin] 500 mg PO QID #28 cap


Instructions:  Foot Sprain (DC)


Forms:  [facility] (English)

## 2018-04-25 NOTE — RAD
PROCEDURE:  Left left ankle dated 04/25/2018 



HISTORY:

Foot pain.



COMPARISON:

Isis relation made with concurrent radiographs of the left foot



FINDINGS:



BONES:

No evidence of acute displaced fracture nor dislocation. The osseous 

structures appear intact.  There is a small round/elliptical shaped 

corticated density within the soft tissues Um overlying the medial 

ankle mortise that probably represent some all posttraumatic 

mineralization.  There is also tiny osteophyte seen arising from the 

inferior medial tip of the medial malleolus. 



JOINTS:

Normal. No osteoarthritis. Ankle mortise maintained. Talar dome intact



SOFT TISSUES:

No significant soft tissue swelling 



OTHER FINDINGS:

None.



IMPRESSION:

No evidence of acute displaced fracture nor dislocation. .